# Patient Record
Sex: MALE | Race: WHITE | NOT HISPANIC OR LATINO | Employment: OTHER | URBAN - METROPOLITAN AREA
[De-identification: names, ages, dates, MRNs, and addresses within clinical notes are randomized per-mention and may not be internally consistent; named-entity substitution may affect disease eponyms.]

---

## 2019-07-05 ENCOUNTER — TRANSCRIBE ORDERS (OUTPATIENT)
Dept: ADMINISTRATIVE | Facility: HOSPITAL | Age: 39
End: 2019-07-05

## 2019-07-05 DIAGNOSIS — L72.9 SKIN CYST: Primary | ICD-10-CM

## 2019-07-26 ENCOUNTER — HOSPITAL ENCOUNTER (OUTPATIENT)
Dept: INFUSION THERAPY | Facility: HOSPITAL | Age: 39
Discharge: HOME | End: 2019-07-26
Admitting: SPECIALIST

## 2019-07-26 VITALS
HEART RATE: 59 BPM | RESPIRATION RATE: 16 BRPM | HEIGHT: 77 IN | SYSTOLIC BLOOD PRESSURE: 134 MMHG | BODY MASS INDEX: 29.52 KG/M2 | WEIGHT: 250 LBS | DIASTOLIC BLOOD PRESSURE: 75 MMHG | OXYGEN SATURATION: 97 % | TEMPERATURE: 97.4 F

## 2019-07-26 DIAGNOSIS — L72.9 SKIN CYST: Primary | ICD-10-CM

## 2019-07-26 PROCEDURE — 88304 TISSUE EXAM BY PATHOLOGIST: CPT | Performed by: SPECIALIST

## 2019-07-26 RX ORDER — LIDOCAINE HYDROCHLORIDE AND EPINEPHRINE 10; 10 MG/ML; UG/ML
20 INJECTION, SOLUTION INFILTRATION; PERINEURAL ONCE
Status: COMPLETED | OUTPATIENT
Start: 2019-07-26 | End: 2019-07-26

## 2019-07-26 RX ADMIN — LIDOCAINE HYDROCHLORIDE,EPINEPHRINE BITARTRATE 20 ML: 10; .01 INJECTION, SOLUTION INFILTRATION; PERINEURAL at 09:27

## 2019-07-26 NOTE — PATIENT INSTRUCTIONS
..MINOR SURGERY DISCHARGE INSTRUCTIONS    IMPORTANT:  The following information will help you return to your best level of health.    Wound Care:  ·  Your dressing may be removed:   ·   Leave dressing on for doctor to remove when you see him at his office.  · If incision is near the ear, clean telephones.  · If incision is on head or neck, cover your pillow with a towel.  · Allow steri-strips to come off as you bathe/shower.  Steri-strips will likely have a red or brown blood stain on them.  The incision will still be secure.    · No cosmetics to incision.  · You may use band aids after dressing is removed.  · Avoid stooping over or heavy lifting.    You may shower:  · Tomorrow  · Do not get steri-strips soaking wet.  · Apply ice to area 30 minutes on and off for the rest of the day and possibly the next day if helpful.  · Elevate area for 24-48 hours to reduce swelling.      Medications:   Following this procedure, you should continue to take all other medications as directed by your primary  physician unless otherwise instructed. There have been no changes to the medications you provided us (with the following exceptions, if applicable):   You may use Tylenol or Advil for discomfort.    Activity:   You may increase your activity as tolerated.   You may resume normal activity:    Tomorrow    No strenuous activity, lifting more than 10lbs, or sports:    For 7 days    Call your doctor to make an appointment for:     On (date) _Friday August 2, 2019________________________  Dr. Haney     Office # [phone]    Incision Care, Adult  An incision is a surgical cut that is made through your skin. Most incisions are closed after surgery. Your incision may be closed with stitches (sutures), staples, skin glue, or adhesive strips. You may need to return to your health care provider to have sutures or staples removed. This may occur several days to several weeks after your surgery. The incision needs to be cared for properly  to prevent infection.  How to care for your incision  Incision care    · Follow instructions from your health care provider about how to take care of your incision. Make sure you:  ? Wash your hands with soap and water before you change the bandage (dressing). If soap and water are not available, use hand .  ? Change your dressing as told by your health care provider.  ? Leave sutures, skin glue, or adhesive strips in place. These skin closures may need to stay in place for 2 weeks or longer. If adhesive strip edges start to loosen and curl up, you may trim the loose edges. Do not remove adhesive strips completely unless your health care provider tells you to do that.  · Check your incision area every day for signs of infection. Check for:  ? More redness, swelling, or pain.  ? More fluid or blood.  ? Warmth.  ? Pus or a bad smell.  · Ask your health care provider how to clean the incision. This may include:  ? Using mild soap and water.  ? Using a clean towel to pat the incision dry after cleaning it.  ? Applying a cream or ointment. Do this only as told by your health care provider.  ? Covering the incision with a clean dressing.  · Ask your health care provider when you can leave the incision uncovered.  · Do not take baths, swim, or use a hot tub until your health care provider approves. Ask your health care provider if you can take showers. You may only be allowed to take sponge baths for bathing.  Medicines  · If you were prescribed an antibiotic medicine, cream, or ointment, take or apply the antibiotic as told by your health care provider. Do not stop taking or applying the antibiotic even if your condition improves.  · Take over-the-counter and prescription medicines only as told by your health care provider.  General instructions  · Limit movement around your incision to improve healing.  ? Avoid straining, lifting, or exercise for the first month, or for as long as told by your health care  provider.  ? Follow instructions from your health care provider about returning to your normal activities.  ? Ask your health care provider what activities are safe.  · Protect your incision from the sun when you are outside for the first 6 months, or for as long as told by your health care provider. Apply sunscreen around the scar or cover it up.  · Keep all follow-up visits as told by your health care provider. This is important.  Contact a health care provider if:  · Your have more redness, swelling, or pain around the incision.  · You have more fluid or blood coming from the incision.  · Your incision feels warm to the touch.  · You have pus or a bad smell coming from the incision.  · You have a fever or shaking chills.  · You are nauseous or you vomit.  · You are dizzy.  · Your sutures or staples come undone.  Get help right away if:  · You have a red streak coming from your incision.  · Your incision bleeds through the dressing and the bleeding does not stop with gentle pressure.  · The edges of your incision open up and separate.  · You have severe pain.  · You have a rash.  · You are confused.  · You faint.  · You have trouble breathing and a fast heartbeat.  This information is not intended to replace advice given to you by your health care provider. Make sure you discuss any questions you have with your health care provider.  Document Released: 07/07/2006 Document Revised: 08/25/2017 Document Reviewed: 07/05/2017  [employer] Patient Education © 2019 [employer]

## 2019-07-26 NOTE — PROGRESS NOTES
Medications added to the MAR by the [facility] nurse were used for procedure by provider.  See provider procedure dictation for details regarding provider's method and timing of administration as well as dosages. Patient tolerated procedure without complaint. Patient discharged ambulatory with AVS, ice pack, and family at 1008.

## 2019-07-27 LAB
CYTO UR: NORMAL
LAB AP CASE REPORT: NORMAL
LAB AP CLINICAL INFORMATION: NORMAL
PATH REPORT.FINAL DX SPEC: NORMAL
PATH REPORT.GROSS SPEC: NORMAL